# Patient Record
Sex: MALE | Race: WHITE | NOT HISPANIC OR LATINO | Employment: OTHER | URBAN - METROPOLITAN AREA
[De-identification: names, ages, dates, MRNs, and addresses within clinical notes are randomized per-mention and may not be internally consistent; named-entity substitution may affect disease eponyms.]

---

## 2018-06-10 ENCOUNTER — HISTORICAL (OUTPATIENT)
Dept: URGENT CARE | Facility: CLINIC | Age: 68
End: 2018-06-10

## 2018-06-10 LAB
BILIRUB SERPL-MCNC: NEGATIVE MG/DL
BLOOD URINE, POC: NEGATIVE
CLARITY, POC UA: CLEAR
COLOR, POC UA: YELLOW
GLUCOSE UR QL STRIP: NEGATIVE
KETONES UR QL STRIP: NEGATIVE
LEUKOCYTE EST, POC UA: NORMAL
NITRITE, POC UA: NEGATIVE
PH, POC UA: 5
PROTEIN, POC: NEGATIVE
SPECIFIC GRAVITY, POC UA: 1.01
UROBILINOGEN, POC UA: NORMAL

## 2022-04-11 ENCOUNTER — HISTORICAL (OUTPATIENT)
Dept: ADMINISTRATIVE | Facility: HOSPITAL | Age: 72
End: 2022-04-11

## 2022-04-25 VITALS
DIASTOLIC BLOOD PRESSURE: 62 MMHG | HEIGHT: 68 IN | SYSTOLIC BLOOD PRESSURE: 112 MMHG | OXYGEN SATURATION: 97 % | WEIGHT: 154.56 LBS | BODY MASS INDEX: 23.43 KG/M2

## 2022-09-18 ENCOUNTER — HOSPITAL ENCOUNTER (INPATIENT)
Facility: HOSPITAL | Age: 72
LOS: 1 days | Discharge: HOME | DRG: 352 | End: 2022-09-20
Attending: EMERGENCY MEDICINE | Admitting: SURGERY
Payer: MEDICARE

## 2022-09-18 DIAGNOSIS — K40.90 RIGHT INGUINAL HERNIA: ICD-10-CM

## 2022-09-18 DIAGNOSIS — Z41.9 SURGERY, ELECTIVE: ICD-10-CM

## 2022-09-18 DIAGNOSIS — R11.14 BILIOUS VOMITING WITH NAUSEA: ICD-10-CM

## 2022-09-18 DIAGNOSIS — K41.30 INCARCERATED FEMORAL HERNIA: Primary | ICD-10-CM

## 2022-09-18 LAB
ALBUMIN SERPL-MCNC: 4.2 GM/DL (ref 3.4–4.8)
ALBUMIN/GLOB SERPL: 1.2 RATIO (ref 1.1–2)
ALP SERPL-CCNC: 101 UNIT/L (ref 40–150)
ALT SERPL-CCNC: 23 UNIT/L (ref 0–55)
APPEARANCE UR: CLEAR
AST SERPL-CCNC: 38 UNIT/L (ref 5–34)
BACTERIA #/AREA URNS AUTO: NORMAL /HPF
BASOPHILS # BLD AUTO: 0.04 X10(3)/MCL (ref 0–0.2)
BASOPHILS # BLD AUTO: 0.05 X10(3)/MCL (ref 0–0.2)
BASOPHILS NFR BLD AUTO: 0.5 %
BASOPHILS NFR BLD AUTO: 0.6 %
BILIRUB UR QL STRIP.AUTO: NEGATIVE MG/DL
BILIRUBIN DIRECT+TOT PNL SERPL-MCNC: 0.5 MG/DL
BUN SERPL-MCNC: 19.9 MG/DL (ref 8.4–25.7)
CALCIUM SERPL-MCNC: 9.7 MG/DL (ref 8.8–10)
CHLORIDE SERPL-SCNC: 101 MMOL/L (ref 98–107)
CO2 SERPL-SCNC: 26 MMOL/L (ref 23–31)
COLOR UR AUTO: YELLOW
CREAT SERPL-MCNC: 1.13 MG/DL (ref 0.73–1.18)
EOSINOPHIL # BLD AUTO: 0.08 X10(3)/MCL (ref 0–0.9)
EOSINOPHIL # BLD AUTO: 0.11 X10(3)/MCL (ref 0–0.9)
EOSINOPHIL NFR BLD AUTO: 0.9 %
EOSINOPHIL NFR BLD AUTO: 1.3 %
ERYTHROCYTE [DISTWIDTH] IN BLOOD BY AUTOMATED COUNT: 13.2 % (ref 11.5–17)
ERYTHROCYTE [DISTWIDTH] IN BLOOD BY AUTOMATED COUNT: 13.2 % (ref 11.5–17)
GFR SERPLBLD CREATININE-BSD FMLA CKD-EPI: >60 MLS/MIN/1.73/M2
GLOBULIN SER-MCNC: 3.6 GM/DL (ref 2.4–3.5)
GLUCOSE SERPL-MCNC: 169 MG/DL (ref 82–115)
GLUCOSE UR QL STRIP.AUTO: NEGATIVE MG/DL
HCT VFR BLD AUTO: 37.8 % (ref 42–52)
HCT VFR BLD AUTO: 39.7 % (ref 42–52)
HGB BLD-MCNC: 12.2 GM/DL (ref 14–18)
HGB BLD-MCNC: 13.3 GM/DL (ref 14–18)
IMM GRANULOCYTES # BLD AUTO: 0.02 X10(3)/MCL (ref 0–0.04)
IMM GRANULOCYTES # BLD AUTO: 0.03 X10(3)/MCL (ref 0–0.04)
IMM GRANULOCYTES NFR BLD AUTO: 0.2 %
IMM GRANULOCYTES NFR BLD AUTO: 0.3 %
KETONES UR QL STRIP.AUTO: NEGATIVE MG/DL
LACTATE SERPL-SCNC: 0.8 MMOL/L (ref 0.5–2.2)
LEUKOCYTE ESTERASE UR QL STRIP.AUTO: NEGATIVE UNIT/L
LIPASE SERPL-CCNC: 18 U/L
LYMPHOCYTES # BLD AUTO: 0.92 X10(3)/MCL (ref 0.6–4.6)
LYMPHOCYTES # BLD AUTO: 1.01 X10(3)/MCL (ref 0.6–4.6)
LYMPHOCYTES NFR BLD AUTO: 10.9 %
LYMPHOCYTES NFR BLD AUTO: 11.6 %
MCH RBC QN AUTO: 30.1 PG (ref 27–31)
MCH RBC QN AUTO: 31 PG (ref 27–31)
MCHC RBC AUTO-ENTMCNC: 32.3 MG/DL (ref 33–36)
MCHC RBC AUTO-ENTMCNC: 33.5 MG/DL (ref 33–36)
MCV RBC AUTO: 92.5 FL (ref 80–94)
MCV RBC AUTO: 93.3 FL (ref 80–94)
MONOCYTES # BLD AUTO: 0.52 X10(3)/MCL (ref 0.1–1.3)
MONOCYTES # BLD AUTO: 0.58 X10(3)/MCL (ref 0.1–1.3)
MONOCYTES NFR BLD AUTO: 6 %
MONOCYTES NFR BLD AUTO: 6.8 %
NEUTROPHILS # BLD AUTO: 6.8 X10(3)/MCL (ref 2.1–9.2)
NEUTROPHILS # BLD AUTO: 7 X10(3)/MCL (ref 2.1–9.2)
NEUTROPHILS NFR BLD AUTO: 80.2 %
NEUTROPHILS NFR BLD AUTO: 80.7 %
NITRITE UR QL STRIP.AUTO: NEGATIVE
NRBC BLD AUTO-RTO: 0 %
NRBC BLD AUTO-RTO: 0 %
PH UR STRIP.AUTO: 6.5 [PH]
PLATELET # BLD AUTO: 234 X10(3)/MCL (ref 130–400)
PLATELET # BLD AUTO: 241 X10(3)/MCL (ref 130–400)
PMV BLD AUTO: 12 FL (ref 7.4–10.4)
PMV BLD AUTO: 12.3 FL (ref 7.4–10.4)
POTASSIUM SERPL-SCNC: 3.7 MMOL/L (ref 3.5–5.1)
PROT SERPL-MCNC: 7.8 GM/DL (ref 5.8–7.6)
PROT UR QL STRIP.AUTO: NEGATIVE MG/DL
RBC # BLD AUTO: 4.05 X10(6)/MCL (ref 4.7–6.1)
RBC # BLD AUTO: 4.29 X10(6)/MCL (ref 4.7–6.1)
RBC #/AREA URNS AUTO: <5 /HPF
RBC UR QL AUTO: NEGATIVE UNIT/L
SODIUM SERPL-SCNC: 135 MMOL/L (ref 136–145)
SP GR UR STRIP.AUTO: >=1.04 (ref 1–1.03)
SQUAMOUS #/AREA URNS AUTO: <5 /HPF
UROBILINOGEN UR STRIP-ACNC: 0.2 MG/DL
WBC # SPEC AUTO: 8.5 X10(3)/MCL (ref 4.5–11.5)
WBC # SPEC AUTO: 8.7 X10(3)/MCL (ref 4.5–11.5)
WBC #/AREA URNS AUTO: <5 /HPF

## 2022-09-18 PROCEDURE — 96374 THER/PROPH/DIAG INJ IV PUSH: CPT

## 2022-09-18 PROCEDURE — G0378 HOSPITAL OBSERVATION PER HR: HCPCS

## 2022-09-18 PROCEDURE — 83690 ASSAY OF LIPASE: CPT | Performed by: NURSE PRACTITIONER

## 2022-09-18 PROCEDURE — 81001 URINALYSIS AUTO W/SCOPE: CPT | Performed by: NURSE PRACTITIONER

## 2022-09-18 PROCEDURE — 85025 COMPLETE CBC W/AUTO DIFF WBC: CPT | Performed by: STUDENT IN AN ORGANIZED HEALTH CARE EDUCATION/TRAINING PROGRAM

## 2022-09-18 PROCEDURE — 25000003 PHARM REV CODE 250: Performed by: STUDENT IN AN ORGANIZED HEALTH CARE EDUCATION/TRAINING PROGRAM

## 2022-09-18 PROCEDURE — 83605 ASSAY OF LACTIC ACID: CPT | Performed by: STUDENT IN AN ORGANIZED HEALTH CARE EDUCATION/TRAINING PROGRAM

## 2022-09-18 PROCEDURE — 85025 COMPLETE CBC W/AUTO DIFF WBC: CPT | Performed by: NURSE PRACTITIONER

## 2022-09-18 PROCEDURE — 36415 COLL VENOUS BLD VENIPUNCTURE: CPT | Performed by: NURSE PRACTITIONER

## 2022-09-18 PROCEDURE — 99285 EMERGENCY DEPT VISIT HI MDM: CPT | Mod: 25

## 2022-09-18 PROCEDURE — 96372 THER/PROPH/DIAG INJ SC/IM: CPT | Performed by: STUDENT IN AN ORGANIZED HEALTH CARE EDUCATION/TRAINING PROGRAM

## 2022-09-18 PROCEDURE — 80053 COMPREHEN METABOLIC PANEL: CPT | Performed by: NURSE PRACTITIONER

## 2022-09-18 PROCEDURE — 63600175 PHARM REV CODE 636 W HCPCS: Performed by: EMERGENCY MEDICINE

## 2022-09-18 PROCEDURE — 96375 TX/PRO/DX INJ NEW DRUG ADDON: CPT

## 2022-09-18 PROCEDURE — 25500020 PHARM REV CODE 255: Performed by: EMERGENCY MEDICINE

## 2022-09-18 PROCEDURE — 63600175 PHARM REV CODE 636 W HCPCS: Performed by: STUDENT IN AN ORGANIZED HEALTH CARE EDUCATION/TRAINING PROGRAM

## 2022-09-18 RX ORDER — ADHESIVE BANDAGE
30 BANDAGE TOPICAL DAILY PRN
Status: DISCONTINUED | OUTPATIENT
Start: 2022-09-18 | End: 2022-09-20 | Stop reason: HOSPADM

## 2022-09-18 RX ORDER — TALC
6 POWDER (GRAM) TOPICAL NIGHTLY PRN
Status: DISCONTINUED | OUTPATIENT
Start: 2022-09-18 | End: 2022-09-20 | Stop reason: HOSPADM

## 2022-09-18 RX ORDER — OXYCODONE HYDROCHLORIDE 5 MG/1
5 TABLET ORAL EVERY 4 HOURS PRN
Status: DISCONTINUED | OUTPATIENT
Start: 2022-09-18 | End: 2022-09-20 | Stop reason: HOSPADM

## 2022-09-18 RX ORDER — PHENOBARBITAL 32.4 MG/1
32.4 TABLET ORAL 2 TIMES DAILY
Status: DISCONTINUED | OUTPATIENT
Start: 2022-09-19 | End: 2022-09-19

## 2022-09-18 RX ORDER — METHOCARBAMOL 750 MG/1
750 TABLET, FILM COATED ORAL 3 TIMES DAILY
Status: DISCONTINUED | OUTPATIENT
Start: 2022-09-18 | End: 2022-09-20 | Stop reason: HOSPADM

## 2022-09-18 RX ORDER — PHENOBARBITAL 32.4 MG/1
32.4 TABLET ORAL 2 TIMES DAILY
Status: DISCONTINUED | OUTPATIENT
Start: 2022-09-18 | End: 2022-09-18

## 2022-09-18 RX ORDER — ACETAMINOPHEN 325 MG/1
650 TABLET ORAL EVERY 4 HOURS
Status: DISCONTINUED | OUTPATIENT
Start: 2022-09-18 | End: 2022-09-20 | Stop reason: HOSPADM

## 2022-09-18 RX ORDER — OXYCODONE HYDROCHLORIDE 5 MG/1
10 TABLET ORAL EVERY 4 HOURS PRN
Status: DISCONTINUED | OUTPATIENT
Start: 2022-09-18 | End: 2022-09-20 | Stop reason: HOSPADM

## 2022-09-18 RX ORDER — SODIUM CHLORIDE 9 MG/ML
INJECTION, SOLUTION INTRAVENOUS CONTINUOUS
Status: DISCONTINUED | OUTPATIENT
Start: 2022-09-18 | End: 2022-09-20 | Stop reason: HOSPADM

## 2022-09-18 RX ORDER — MULTIVITAMIN
1 TABLET ORAL DAILY
COMMUNITY

## 2022-09-18 RX ORDER — PHENYTOIN SODIUM 100 MG/1
100 CAPSULE, EXTENDED RELEASE ORAL 2 TIMES DAILY
Status: DISCONTINUED | OUTPATIENT
Start: 2022-09-18 | End: 2022-09-20 | Stop reason: HOSPADM

## 2022-09-18 RX ORDER — MORPHINE SULFATE 4 MG/ML
4 INJECTION, SOLUTION INTRAMUSCULAR; INTRAVENOUS
Status: COMPLETED | OUTPATIENT
Start: 2022-09-18 | End: 2022-09-18

## 2022-09-18 RX ORDER — ENOXAPARIN SODIUM 100 MG/ML
40 INJECTION SUBCUTANEOUS EVERY 24 HOURS
Status: DISCONTINUED | OUTPATIENT
Start: 2022-09-18 | End: 2022-09-20 | Stop reason: HOSPADM

## 2022-09-18 RX ORDER — GABAPENTIN 300 MG/1
300 CAPSULE ORAL 3 TIMES DAILY
Status: DISCONTINUED | OUTPATIENT
Start: 2022-09-18 | End: 2022-09-20 | Stop reason: HOSPADM

## 2022-09-18 RX ORDER — ATORVASTATIN CALCIUM 20 MG/1
20 TABLET, FILM COATED ORAL NIGHTLY
COMMUNITY
Start: 2022-09-02

## 2022-09-18 RX ORDER — DOCUSATE SODIUM 100 MG/1
100 CAPSULE, LIQUID FILLED ORAL 2 TIMES DAILY
Status: DISCONTINUED | OUTPATIENT
Start: 2022-09-18 | End: 2022-09-20 | Stop reason: HOSPADM

## 2022-09-18 RX ORDER — LACTOBACIL 2/BIFIDO 11/S.THERM 112.5B
CAPSULE ORAL
COMMUNITY

## 2022-09-18 RX ORDER — CHOLECALCIFEROL (VITAMIN D3) 50 MCG
2000 TABLET ORAL 2 TIMES DAILY
COMMUNITY

## 2022-09-18 RX ORDER — ONDANSETRON 2 MG/ML
4 INJECTION INTRAMUSCULAR; INTRAVENOUS
Status: COMPLETED | OUTPATIENT
Start: 2022-09-18 | End: 2022-09-18

## 2022-09-18 RX ORDER — POLYETHYLENE GLYCOL 3350 17 G/17G
17 POWDER, FOR SOLUTION ORAL 2 TIMES DAILY
Status: DISCONTINUED | OUTPATIENT
Start: 2022-09-18 | End: 2022-09-20 | Stop reason: HOSPADM

## 2022-09-18 RX ORDER — ASPIRIN 81 MG/1
81 TABLET ORAL
COMMUNITY

## 2022-09-18 RX ORDER — PHENYTOIN SODIUM 100 MG/1
100 CAPSULE, EXTENDED RELEASE ORAL
COMMUNITY
Start: 2022-09-08

## 2022-09-18 RX ORDER — PHENOBARBITAL 15 MG/1
15 TABLET ORAL
COMMUNITY
Start: 2022-09-08

## 2022-09-18 RX ADMIN — ONDANSETRON 4 MG: 2 INJECTION INTRAMUSCULAR; INTRAVENOUS at 08:09

## 2022-09-18 RX ADMIN — METHOCARBAMOL 750 MG: 750 TABLET ORAL at 09:09

## 2022-09-18 RX ADMIN — OXYCODONE 10 MG: 5 TABLET ORAL at 11:09

## 2022-09-18 RX ADMIN — DOCUSATE SODIUM 100 MG: 100 CAPSULE, LIQUID FILLED ORAL at 09:09

## 2022-09-18 RX ADMIN — ENOXAPARIN SODIUM 40 MG: 40 INJECTION SUBCUTANEOUS at 09:09

## 2022-09-18 RX ADMIN — POLYETHYLENE GLYCOL 3350 17 G: 17 POWDER, FOR SOLUTION ORAL at 09:09

## 2022-09-18 RX ADMIN — IOPAMIDOL 100 ML: 755 INJECTION, SOLUTION INTRAVENOUS at 08:09

## 2022-09-18 RX ADMIN — MORPHINE SULFATE 4 MG: 4 INJECTION INTRAVENOUS at 08:09

## 2022-09-18 RX ADMIN — SODIUM CHLORIDE: 9 INJECTION, SOLUTION INTRAVENOUS at 09:09

## 2022-09-18 RX ADMIN — ACETAMINOPHEN 650 MG: 325 TABLET ORAL at 09:09

## 2022-09-18 RX ADMIN — GABAPENTIN 300 MG: 300 CAPSULE ORAL at 09:09

## 2022-09-19 ENCOUNTER — ANESTHESIA EVENT (OUTPATIENT)
Dept: SURGERY | Facility: HOSPITAL | Age: 72
DRG: 352 | End: 2022-09-19
Payer: MEDICARE

## 2022-09-19 ENCOUNTER — ANESTHESIA (OUTPATIENT)
Dept: SURGERY | Facility: HOSPITAL | Age: 72
DRG: 352 | End: 2022-09-19
Payer: MEDICARE

## 2022-09-19 PROBLEM — K41.30 INCARCERATED FEMORAL HERNIA: Status: ACTIVE | Noted: 2022-09-19

## 2022-09-19 LAB
ALBUMIN SERPL-MCNC: 3.6 GM/DL (ref 3.4–4.8)
ALBUMIN/GLOB SERPL: 1.3 RATIO (ref 1.1–2)
ALP SERPL-CCNC: 83 UNIT/L (ref 40–150)
ALT SERPL-CCNC: 16 UNIT/L (ref 0–55)
AST SERPL-CCNC: 28 UNIT/L (ref 5–34)
BASOPHILS # BLD AUTO: 0.05 X10(3)/MCL (ref 0–0.2)
BASOPHILS NFR BLD AUTO: 0.5 %
BILIRUBIN DIRECT+TOT PNL SERPL-MCNC: 0.6 MG/DL
BUN SERPL-MCNC: 14.4 MG/DL (ref 8.4–25.7)
CALCIUM SERPL-MCNC: 9 MG/DL (ref 8.8–10)
CHLORIDE SERPL-SCNC: 103 MMOL/L (ref 98–107)
CO2 SERPL-SCNC: 27 MMOL/L (ref 23–31)
CREAT SERPL-MCNC: 1 MG/DL (ref 0.73–1.18)
CRP SERPL-MCNC: 11.7 MG/L
EOSINOPHIL # BLD AUTO: 0.32 X10(3)/MCL (ref 0–0.9)
EOSINOPHIL NFR BLD AUTO: 3.1 %
ERYTHROCYTE [DISTWIDTH] IN BLOOD BY AUTOMATED COUNT: 13.4 % (ref 11.5–17)
GFR SERPLBLD CREATININE-BSD FMLA CKD-EPI: >60 MLS/MIN/1.73/M2
GLOBULIN SER-MCNC: 2.8 GM/DL (ref 2.4–3.5)
GLUCOSE SERPL-MCNC: 95 MG/DL (ref 82–115)
HCT VFR BLD AUTO: 37.9 % (ref 42–52)
HGB BLD-MCNC: 12.4 GM/DL (ref 14–18)
IMM GRANULOCYTES # BLD AUTO: 0.03 X10(3)/MCL (ref 0–0.04)
IMM GRANULOCYTES NFR BLD AUTO: 0.3 %
LACTATE SERPL-SCNC: 0.7 MMOL/L (ref 0.5–2.2)
LYMPHOCYTES # BLD AUTO: 1.93 X10(3)/MCL (ref 0.6–4.6)
LYMPHOCYTES NFR BLD AUTO: 18.8 %
MCH RBC QN AUTO: 30 PG (ref 27–31)
MCHC RBC AUTO-ENTMCNC: 32.7 MG/DL (ref 33–36)
MCV RBC AUTO: 91.5 FL (ref 80–94)
MONOCYTES # BLD AUTO: 1.01 X10(3)/MCL (ref 0.1–1.3)
MONOCYTES NFR BLD AUTO: 9.8 %
NEUTROPHILS # BLD AUTO: 6.9 X10(3)/MCL (ref 2.1–9.2)
NEUTROPHILS NFR BLD AUTO: 67.5 %
NRBC BLD AUTO-RTO: 0 %
PLATELET # BLD AUTO: 223 X10(3)/MCL (ref 130–400)
PMV BLD AUTO: 12.1 FL (ref 7.4–10.4)
POTASSIUM SERPL-SCNC: 3.9 MMOL/L (ref 3.5–5.1)
PREALB SERPL-MCNC: 27.8 MG/DL (ref 16–42)
PROT SERPL-MCNC: 6.4 GM/DL (ref 5.8–7.6)
RBC # BLD AUTO: 4.14 X10(6)/MCL (ref 4.7–6.1)
SODIUM SERPL-SCNC: 136 MMOL/L (ref 136–145)
WBC # SPEC AUTO: 10.3 X10(3)/MCL (ref 4.5–11.5)

## 2022-09-19 PROCEDURE — 99900035 HC TECH TIME PER 15 MIN (STAT)

## 2022-09-19 PROCEDURE — 93005 ELECTROCARDIOGRAM TRACING: CPT

## 2022-09-19 PROCEDURE — 93010 EKG 12-LEAD: ICD-10-PCS | Mod: ,,, | Performed by: INTERNAL MEDICINE

## 2022-09-19 PROCEDURE — C1781 MESH (IMPLANTABLE): HCPCS | Performed by: COLON & RECTAL SURGERY

## 2022-09-19 PROCEDURE — 93010 ELECTROCARDIOGRAM REPORT: CPT | Mod: ,,, | Performed by: INTERNAL MEDICINE

## 2022-09-19 PROCEDURE — 36415 COLL VENOUS BLD VENIPUNCTURE: CPT | Performed by: STUDENT IN AN ORGANIZED HEALTH CARE EDUCATION/TRAINING PROGRAM

## 2022-09-19 PROCEDURE — 84134 ASSAY OF PREALBUMIN: CPT | Performed by: STUDENT IN AN ORGANIZED HEALTH CARE EDUCATION/TRAINING PROGRAM

## 2022-09-19 PROCEDURE — 36000707: Performed by: COLON & RECTAL SURGERY

## 2022-09-19 PROCEDURE — 83605 ASSAY OF LACTIC ACID: CPT | Performed by: STUDENT IN AN ORGANIZED HEALTH CARE EDUCATION/TRAINING PROGRAM

## 2022-09-19 PROCEDURE — 25000003 PHARM REV CODE 250: Performed by: STUDENT IN AN ORGANIZED HEALTH CARE EDUCATION/TRAINING PROGRAM

## 2022-09-19 PROCEDURE — 25000003 PHARM REV CODE 250: Performed by: EMERGENCY MEDICINE

## 2022-09-19 PROCEDURE — 63600175 PHARM REV CODE 636 W HCPCS: Performed by: NURSE PRACTITIONER

## 2022-09-19 PROCEDURE — 71000033 HC RECOVERY, INTIAL HOUR: Performed by: COLON & RECTAL SURGERY

## 2022-09-19 PROCEDURE — 11000001 HC ACUTE MED/SURG PRIVATE ROOM

## 2022-09-19 PROCEDURE — 63600175 PHARM REV CODE 636 W HCPCS: Performed by: STUDENT IN AN ORGANIZED HEALTH CARE EDUCATION/TRAINING PROGRAM

## 2022-09-19 PROCEDURE — 63600175 PHARM REV CODE 636 W HCPCS: Performed by: ANESTHESIOLOGY

## 2022-09-19 PROCEDURE — 85025 COMPLETE CBC W/AUTO DIFF WBC: CPT | Performed by: STUDENT IN AN ORGANIZED HEALTH CARE EDUCATION/TRAINING PROGRAM

## 2022-09-19 PROCEDURE — 99900031 HC PATIENT EDUCATION (STAT)

## 2022-09-19 PROCEDURE — 94799 UNLISTED PULMONARY SVC/PX: CPT

## 2022-09-19 PROCEDURE — 37000009 HC ANESTHESIA EA ADD 15 MINS: Performed by: COLON & RECTAL SURGERY

## 2022-09-19 PROCEDURE — 86140 C-REACTIVE PROTEIN: CPT | Performed by: STUDENT IN AN ORGANIZED HEALTH CARE EDUCATION/TRAINING PROGRAM

## 2022-09-19 PROCEDURE — 80053 COMPREHEN METABOLIC PANEL: CPT | Performed by: STUDENT IN AN ORGANIZED HEALTH CARE EDUCATION/TRAINING PROGRAM

## 2022-09-19 PROCEDURE — 36000706: Performed by: COLON & RECTAL SURGERY

## 2022-09-19 PROCEDURE — C1729 CATH, DRAINAGE: HCPCS | Performed by: COLON & RECTAL SURGERY

## 2022-09-19 PROCEDURE — 37000008 HC ANESTHESIA 1ST 15 MINUTES: Performed by: COLON & RECTAL SURGERY

## 2022-09-19 PROCEDURE — 25000003 PHARM REV CODE 250: Performed by: COLON & RECTAL SURGERY

## 2022-09-19 DEVICE — IMPLANTABLE DEVICE: Type: IMPLANTABLE DEVICE | Site: ABDOMEN | Status: FUNCTIONAL

## 2022-09-19 RX ORDER — ROCURONIUM BROMIDE 10 MG/ML
INJECTION, SOLUTION INTRAVENOUS
Status: DISCONTINUED | OUTPATIENT
Start: 2022-09-19 | End: 2022-09-19

## 2022-09-19 RX ORDER — LIDOCAINE HYDROCHLORIDE 20 MG/ML
INJECTION INTRAVENOUS
Status: DISCONTINUED | OUTPATIENT
Start: 2022-09-19 | End: 2022-09-19

## 2022-09-19 RX ORDER — ONDANSETRON 2 MG/ML
INJECTION INTRAMUSCULAR; INTRAVENOUS
Status: DISCONTINUED | OUTPATIENT
Start: 2022-09-19 | End: 2022-09-19

## 2022-09-19 RX ORDER — PHENOBARBITAL 15 MG/1
15 TABLET ORAL 3 TIMES DAILY
Status: DISCONTINUED | OUTPATIENT
Start: 2022-09-19 | End: 2022-09-20 | Stop reason: HOSPADM

## 2022-09-19 RX ORDER — HYDROMORPHONE HYDROCHLORIDE 2 MG/ML
0.2 INJECTION, SOLUTION INTRAMUSCULAR; INTRAVENOUS; SUBCUTANEOUS EVERY 5 MIN PRN
Status: DISCONTINUED | OUTPATIENT
Start: 2022-09-19 | End: 2022-09-19

## 2022-09-19 RX ORDER — SODIUM CHLORIDE, SODIUM GLUCONATE, SODIUM ACETATE, POTASSIUM CHLORIDE AND MAGNESIUM CHLORIDE 30; 37; 368; 526; 502 MG/100ML; MG/100ML; MG/100ML; MG/100ML; MG/100ML
1000 INJECTION, SOLUTION INTRAVENOUS CONTINUOUS
Status: CANCELLED | OUTPATIENT
Start: 2022-09-19 | End: 2022-10-19

## 2022-09-19 RX ORDER — PROPOFOL 10 MG/ML
VIAL (ML) INTRAVENOUS
Status: DISCONTINUED | OUTPATIENT
Start: 2022-09-19 | End: 2022-09-19

## 2022-09-19 RX ORDER — MIDAZOLAM HYDROCHLORIDE 1 MG/ML
INJECTION INTRAMUSCULAR; INTRAVENOUS
Status: COMPLETED
Start: 2022-09-19 | End: 2022-09-19

## 2022-09-19 RX ORDER — MIDAZOLAM HYDROCHLORIDE 1 MG/ML
INJECTION INTRAMUSCULAR; INTRAVENOUS
Status: DISCONTINUED | OUTPATIENT
Start: 2022-09-19 | End: 2022-09-19

## 2022-09-19 RX ORDER — FENTANYL CITRATE 50 UG/ML
INJECTION, SOLUTION INTRAMUSCULAR; INTRAVENOUS
Status: DISCONTINUED | OUTPATIENT
Start: 2022-09-19 | End: 2022-09-19

## 2022-09-19 RX ORDER — ONDANSETRON 2 MG/ML
4 INJECTION INTRAMUSCULAR; INTRAVENOUS EVERY 8 HOURS PRN
Status: DISCONTINUED | OUTPATIENT
Start: 2022-09-19 | End: 2022-09-20 | Stop reason: HOSPADM

## 2022-09-19 RX ORDER — LIDOCAINE HYDROCHLORIDE 10 MG/ML
1 INJECTION, SOLUTION EPIDURAL; INFILTRATION; INTRACAUDAL; PERINEURAL ONCE
Status: CANCELLED | OUTPATIENT
Start: 2022-09-19 | End: 2022-09-19

## 2022-09-19 RX ORDER — PHENOBARBITAL 32.4 MG/1
32.4 TABLET ORAL 2 TIMES DAILY
Status: CANCELLED | OUTPATIENT
Start: 2022-09-19

## 2022-09-19 RX ORDER — CEFAZOLIN SODIUM 1 G/3ML
INJECTION, POWDER, FOR SOLUTION INTRAMUSCULAR; INTRAVENOUS
Status: DISCONTINUED | OUTPATIENT
Start: 2022-09-19 | End: 2022-09-19

## 2022-09-19 RX ORDER — DEXAMETHASONE SODIUM PHOSPHATE 4 MG/ML
INJECTION, SOLUTION INTRA-ARTICULAR; INTRALESIONAL; INTRAMUSCULAR; INTRAVENOUS; SOFT TISSUE
Status: DISCONTINUED | OUTPATIENT
Start: 2022-09-19 | End: 2022-09-19

## 2022-09-19 RX ORDER — PROCHLORPERAZINE EDISYLATE 5 MG/ML
5 INJECTION INTRAMUSCULAR; INTRAVENOUS EVERY 30 MIN PRN
Status: DISCONTINUED | OUTPATIENT
Start: 2022-09-19 | End: 2022-09-20 | Stop reason: HOSPADM

## 2022-09-19 RX ORDER — IPRATROPIUM BROMIDE AND ALBUTEROL SULFATE 2.5; .5 MG/3ML; MG/3ML
3 SOLUTION RESPIRATORY (INHALATION)
Status: DISCONTINUED | OUTPATIENT
Start: 2022-09-19 | End: 2022-09-20 | Stop reason: HOSPADM

## 2022-09-19 RX ORDER — DIPHENHYDRAMINE HYDROCHLORIDE 50 MG/ML
25 INJECTION INTRAMUSCULAR; INTRAVENOUS EVERY 6 HOURS PRN
Status: DISCONTINUED | OUTPATIENT
Start: 2022-09-19 | End: 2022-09-20 | Stop reason: HOSPADM

## 2022-09-19 RX ORDER — MORPHINE SULFATE 4 MG/ML
4 INJECTION, SOLUTION INTRAMUSCULAR; INTRAVENOUS EVERY 4 HOURS PRN
Status: DISCONTINUED | OUTPATIENT
Start: 2022-09-19 | End: 2022-09-20 | Stop reason: HOSPADM

## 2022-09-19 RX ORDER — HYDROMORPHONE HYDROCHLORIDE 2 MG/ML
0.4 INJECTION, SOLUTION INTRAMUSCULAR; INTRAVENOUS; SUBCUTANEOUS EVERY 5 MIN PRN
Status: DISCONTINUED | OUTPATIENT
Start: 2022-09-19 | End: 2022-09-20 | Stop reason: HOSPADM

## 2022-09-19 RX ORDER — ONDANSETRON 2 MG/ML
4 INJECTION INTRAMUSCULAR; INTRAVENOUS DAILY PRN
Status: DISCONTINUED | OUTPATIENT
Start: 2022-09-19 | End: 2022-09-20 | Stop reason: HOSPADM

## 2022-09-19 RX ORDER — EPHEDRINE SULFATE 50 MG/ML
INJECTION, SOLUTION INTRAVENOUS
Status: DISCONTINUED | OUTPATIENT
Start: 2022-09-19 | End: 2022-09-19

## 2022-09-19 RX ADMIN — MIDAZOLAM HYDROCHLORIDE 2 MG: 1 INJECTION, SOLUTION INTRAMUSCULAR; INTRAVENOUS at 12:09

## 2022-09-19 RX ADMIN — ACETAMINOPHEN 650 MG: 325 TABLET ORAL at 05:09

## 2022-09-19 RX ADMIN — EPHEDRINE SULFATE 10 MG: 50 INJECTION INTRAVENOUS at 01:09

## 2022-09-19 RX ADMIN — ACETAMINOPHEN 650 MG: 325 TABLET ORAL at 09:09

## 2022-09-19 RX ADMIN — HYDROMORPHONE HYDROCHLORIDE 0.4 MG: 2 INJECTION INTRAMUSCULAR; INTRAVENOUS; SUBCUTANEOUS at 03:09

## 2022-09-19 RX ADMIN — ONDANSETRON 4 MG: 2 INJECTION INTRAMUSCULAR; INTRAVENOUS at 01:09

## 2022-09-19 RX ADMIN — ENOXAPARIN SODIUM 40 MG: 40 INJECTION SUBCUTANEOUS at 05:09

## 2022-09-19 RX ADMIN — FENTANYL CITRATE 50 MCG: 50 INJECTION, SOLUTION INTRAMUSCULAR; INTRAVENOUS at 01:09

## 2022-09-19 RX ADMIN — MORPHINE SULFATE 4 MG: 4 INJECTION INTRAVENOUS at 07:09

## 2022-09-19 RX ADMIN — POLYETHYLENE GLYCOL 3350 17 G: 17 POWDER, FOR SOLUTION ORAL at 09:09

## 2022-09-19 RX ADMIN — LIDOCAINE HYDROCHLORIDE 40 MG: 20 INJECTION INTRAVENOUS at 01:09

## 2022-09-19 RX ADMIN — EPHEDRINE SULFATE 5 MG: 50 INJECTION INTRAVENOUS at 01:09

## 2022-09-19 RX ADMIN — METHOCARBAMOL 750 MG: 750 TABLET ORAL at 09:09

## 2022-09-19 RX ADMIN — PROPOFOL 160 MG: 10 INJECTION, EMULSION INTRAVENOUS at 01:09

## 2022-09-19 RX ADMIN — DOCUSATE SODIUM 100 MG: 100 CAPSULE, LIQUID FILLED ORAL at 09:09

## 2022-09-19 RX ADMIN — ROCURONIUM BROMIDE 50 MG: 10 SOLUTION INTRAVENOUS at 01:09

## 2022-09-19 RX ADMIN — SUGAMMADEX 200 MG: 100 INJECTION, SOLUTION INTRAVENOUS at 02:09

## 2022-09-19 RX ADMIN — GABAPENTIN 300 MG: 300 CAPSULE ORAL at 09:09

## 2022-09-19 RX ADMIN — OXYCODONE 5 MG: 5 TABLET ORAL at 06:09

## 2022-09-19 RX ADMIN — CEFAZOLIN 2 G: 330 INJECTION, POWDER, FOR SOLUTION INTRAMUSCULAR; INTRAVENOUS at 01:09

## 2022-09-19 RX ADMIN — EPHEDRINE SULFATE 10 MG: 50 INJECTION INTRAVENOUS at 02:09

## 2022-09-19 RX ADMIN — DEXAMETHASONE SODIUM PHOSPHATE 4 MG: 4 INJECTION, SOLUTION INTRA-ARTICULAR; INTRALESIONAL; INTRAMUSCULAR; INTRAVENOUS; SOFT TISSUE at 01:09

## 2022-09-19 RX ADMIN — SODIUM CHLORIDE, SODIUM GLUCONATE, SODIUM ACETATE, POTASSIUM CHLORIDE AND MAGNESIUM CHLORIDE: 526; 502; 368; 37; 30 INJECTION, SOLUTION INTRAVENOUS at 12:09

## 2022-09-19 RX ADMIN — PHENYTOIN SODIUM 100 MG: 100 CAPSULE ORAL at 05:09

## 2022-09-19 RX ADMIN — ONDANSETRON 4 MG: 2 INJECTION INTRAMUSCULAR; INTRAVENOUS at 07:09

## 2022-09-19 RX ADMIN — PHENOBARBITAL 15 MG: 15 TABLET ORAL at 05:09

## 2022-09-19 RX ADMIN — SODIUM CHLORIDE: 9 INJECTION, SOLUTION INTRAVENOUS at 11:09

## 2022-09-19 NOTE — ANESTHESIA PROCEDURE NOTES
Intubation    Date/Time: 9/19/2022 1:02 PM  Performed by: Merrick Mcclure CRNA  Authorized by: Jarvis Stuart MD     Intubation:     Induction:  Intravenous    Intubated:  Postinduction    Mask Ventilation:  Easy mask    Attempts:  1    Attempted By:  CRNA    Method of Intubation:  Direct    Blade:  Clay 3    Laryngeal View Grade: Grade IIA - cords partially seen      Difficult Airway Encountered?: No      Complications:  None    Airway Device:  Oral endotracheal tube    Airway Device Size:  7.5    Style/Cuff Inflation:  Cuffed (inflated to minimal occlusive pressure)    Tube secured:  22    Secured at:  The lips    Placement Verified By:  Capnometry    Complicating Factors:  None    Findings Post-Intubation:  BS equal bilateral and atraumatic/condition of teeth unchanged

## 2022-09-19 NOTE — PROGRESS NOTES
Trauma/Acute Care Surgery   Daily Progress Note     HD#0  POD#* No surgery date entered *    Subjective     HPI:   Mr. Barbour is a 72 yo M with no significant past medical history who presents with painful bulge to right groin that began suddenly this afternoon while lifting heavy boxes. Pain is associated with nausea and an episode of vomiting, but denies fever or chills. He is currently afebrile with stable vital signs and a normal WBC and LA. CT abdomen pelvis confirms a right inguinal hernia containing distal ileum with mild proximal dilation of bowel. Hernia was mostly reduced upon our evaluation.     Interval History:   NAERON. Vital signs have been stable and he is afebrile. Admits to persistent pain this AM. Denies any vomiting, nausea, SOB. Has been NPO since last night for possible surgical intervention today.     PRN Meds:  Mg hydroxide  Melatonin 6mg  Morphine 4mg  Ondansetron 4mg  Oxycodone 10mg  Oxycodone 5mg     Objective  Temp:  [98.2 °F (36.8 °C)] 98.2 °F (36.8 °C)  Pulse:  [52-61] 59  Resp:  [17-71] 17  SpO2:  [95 %-98 %] 96 %  BP: (119-146)/(60-78) 144/76     Intake/Output/LDA:  NG tube - 250 mL     Physical Exam:  GEN: NAD  NEURO: awake, alert, answering questions appropriately  RESP: nonlabored breathing, breath sounds normal bilaterally  CV: RRR  ABD: soft, nontender, no rebound or guarding present. Bowel sounds heard in all 4 quadrants. R inguinal hernia present with no overlyign skin changes or warmth  MSK: moving all 4 limbs spontaneously and purposefully      Labs  WBC 10.3  H/H 12.4/37.9  Plt 223  K 3.9  BUN/Cr 14.4/1.00  LA 0.7  UA negative    Imaging  CT abdomen/pelvis with contrast:  1. A cystic lesion measuring 2.9 cm wide is seen in the body of the pancreas (series 2 image 26). Correlate with clinical and laboratory findings with regards to further evaluation and follow-up.  2. There is a right inguinal hernia containing a mildly dilated fluid-filled segment of the distal ileum.  The loops of the ileum proximal to the herniated ileal segment are also mildly dilated and fluid-filled. The ileal loops immediately distal to the herniated segment appear collapsed. The degree of wall enhancement of the herniated ileal segment appears less as compared with the rest of the ileum. There is also mild subcutaneous fat stranding surrounding the right inguinal hernia. These findings are consistent with mechanical small bowel obstruction related to the right inguinal hernia with possible vascular compromise of the inguinal hernia not excluded. Correlate with clinical and laboratory findings with regards to further evaluation and follow-up.  3. Details and other findings as discussed above.     Assessment/Plan  72 yo M with right inguinal hernia that was reduced last night at bedside.      - Admit to surgery service for overnight observation  - pain and symptoms have persisted  - remain NPO   - Going to OR today for surgical repair; pt is consented       Jr Urban MD  [facility]

## 2022-09-19 NOTE — PROGRESS NOTES
Pt, spouse, and RN all present for report. Pt attached to v/s paige and vitals WNL. Sx site examined and intact. Post op orders reviewed. Everyone understands pt info with no further questions.

## 2022-09-19 NOTE — H&P
Acute Care Surgery  Admission H&P        Subjective:     HPI:   Mr. Barbour is a 70 yo M with no significant past medical history who presents with painful bulge to right groin that began suddenly this afternoon while lifting heavy boxes. Pain is associated with nausea and an episode of vomiting, but denies fever or chills. He is currently afebrile with stable vital signs and a normal WBC and LA. CT abdomen pelvis confirms a right inguinal hernia containing distal ileum with mild proximal dilation of bowel. Hernia was mostly reduced upon our evaluation.      PMH:  None     PSH:  None      Medications:  No current facility-administered medications on file prior to encounter.     Current Outpatient Medications on File Prior to Encounter   Medication Sig Dispense Refill    PHENobarbitaL (LUMINAL) 15 MG tablet Take 15 mg by mouth.      phenytoin (DILANTIN) 100 MG ER capsule Take 100 mg by mouth.      aspirin (ECOTRIN) 81 MG EC tablet Take 81 mg by mouth.      atorvastatin (LIPITOR) 20 MG tablet Take 20 mg by mouth every evening.      cholecalciferol, vitamin D3, (VITAMIN D3) 50 mcg (2,000 unit) Tab Take 2,000 Units by mouth 2 (two) times daily.      Lactobac 2-Bifido 11-S. therm (HIGH POTENCY PROBIOTIC) 112.5 billion cell Cap Take by mouth.      multivitamin (THERAGRAN) per tablet Take 1 tablet by mouth once daily.          Allergies:  Review of patient's allergies indicates:   Allergen Reactions    Procaine      Other reaction(s): Seizure         Social Hx:  Social History     Tobacco Use   Smoking Status Not on file   Smokeless Tobacco Not on file      Social History     Substance and Sexual Activity   Alcohol Use Not on file         Relevant Family Hx:  No family history on file.      Objective:     Vitals:  BP: 119-143/60-78   Pulse: 52-61   Resp: 17   Temp: 98.2 F      Physical Exam:  Gen: NAD  Neuro: awake, alert, answering questions appropriately  CV: RRR  Resp: non-labored breathing, ROSHNI  Abd: soft, ND, NT.  Right inguinal hernia that is easily reducible at bedside. No overlying skin changes or warmth.   Ext: moves all 4 spontaneously and purposefully  Skin: warm, well perfused     Labs:  WBC 8.5  H/H 12.2/37.8  Plt 234  K 3.7  BUN/Cr 19.9/1.13  LA 0.8  UA negative      Imaging:  CT abdomen/pelvis with contrast:  1. A cystic lesion measuring 2.9 cm wide is seen in the body of the pancreas (series 2 image 26). Correlate with clinical and laboratory findings with regards to further evaluation and follow-up.  2. There is a right inguinal hernia containing a mildly dilated fluid-filled segment of the distal ileum. The loops of the ileum proximal to the herniated ileal segment are also mildly dilated and fluid-filled. The ileal loops immediately distal to the herniated segment appear collapsed. The degree of wall enhancement of the herniated ileal segment appears less as compared with the rest of the ileum. There is also mild subcutaneous fat stranding surrounding the right inguinal hernia. These findings are consistent with mechanical small bowel obstruction related to the right inguinal hernia with possible vascular compromise of the inguinal hernia not excluded. Correlate with clinical and laboratory findings with regards to further evaluation and follow-up.  3. Details and other findings as discussed above.     Assessment/Plan:  72 yo M with right inguinal hernia that was reduced at bedside.     - Admit to surgery service for overnight observation  - will get repeat LA with am labs  - if pain and symptoms persist/worsen in the morning, will discuss surgical intervention  - keep NPO for now, Iman Ashraf MD  [facility] Surgery, PGY-2  09/19/2022 12:47 AM

## 2022-09-19 NOTE — INTERVAL H&P NOTE
The patient has been examined and the H&P has been reviewed:    I concur with the findings and no changes have occurred since H&P was written.    Surgery risks, benefits and alternative options discussed and understood by patient/family.          Active Hospital Problems    Diagnosis  POA    *Incarcerated femoral hernia [K41.30]  Yes      Resolved Hospital Problems   No resolved problems to display.

## 2022-09-19 NOTE — OP NOTE
[facility] - 8th Floor Med Surg  Operative Note      Date of Procedure: 9/19/2022     Procedure: Incarcerated right femoral hernia     Surgeon(s) and Role:     * Sarwat Bustillo MD - Primary    Assisting Surgeon: Alexis Scheuermann, MD    Pre-Operative Diagnosis: Right inguinal hernia [K40.90]    Post-Operative Diagnosis: Right femoral hernia    Anesthesia: General    Estimated Blood Loss (EBL): 10 mL           Specimens: Hernia sac     Description of Technical Procedures:  After informed consent was obtained, patient was brought to the operating room and placed in the supine position.  Next general endotracheal anesthesia was administered by member of the anesthesia team.  The right groin was prepped and draped in sterile surgical fashion.  A small transverse linear incision was made with a 15 blade approximately 1 finger breath above the right inguinal ligament.  Incision was deepened through the subcutaneous tissue to the external oblique fascia with electrocautery.  A stab incision was made laterally in the external oblique fascia, which was then opened down along its fibers through the external ring with Metzenbaum scissors.  Spermatic cord and its contents within mobilized off of the inguinal floor and isolated with a Penrose drain.  No hernia sac was identified on the anterior medial surface.  The incarcerated hernia was noted below low the inguinal ligament consistent with a femoral hernia.  This was dissected free from the surrounding subcutaneous tissue using gentle blunt dissection.  The hernia sac was opened and noted to contain omentum.  This was reduced into the peritoneal cavity.  The hernia sac was ligated with a 2-0 silk suture.  Excess sac was excised and sent for permanent pathology.  The remnant retracted through the defect.  The defect was then primarily repaired with interrupted 0 Vicryl suture and reinforced with bright tacks mesh sutured in place with 2-0 Prolene suture.   Spermatic cord and contents were returned to their normal anatomic position.  The wound was irrigated hemostasis was ensured.  The external oblique fascia was reapproximated with running 3-0 Vicryl suture.  Skin edges were reapproximated with Monocryl suture in a subcuticular fashion.  Incision was cleaned and a sterile dressing was applied.  Patient tolerated the procedure well and there were no complications.  He was awakened extubated in the operating room, then subsequent transferred to recovery in satisfactory condition.

## 2022-09-19 NOTE — ED PROVIDER NOTES
Encounter Date: 9/18/2022       History     Chief Complaint   Patient presents with    Inguinal Hernia     Recent swelling to right inguinal area, with nausea, vomiting.  Symptoms started today.  States has been moving heavy boxes.      Patient is a 71-year-old male presenting with complain of swelling and pain to the right inguinal area since around 3:00 a.m. this afternoon after moving heavy boxes.  Patient denies prior history of inguinal hernias.  Patient states he has had some nausea and vomiting.  He states that nausea is currently improved.    Review of patient's allergies indicates:   Allergen Reactions    Procaine      Other reaction(s): Seizure     No past medical history on file.  No past surgical history on file.  No family history on file.     Review of Systems   Constitutional: Negative.    HENT: Negative.     Respiratory: Negative.     Cardiovascular: Negative.    Gastrointestinal:  Positive for abdominal pain, nausea and vomiting. Negative for blood in stool, constipation and diarrhea.   Musculoskeletal: Negative.    Skin: Negative.    Neurological: Negative.    Psychiatric/Behavioral: Negative.       Physical Exam     Initial Vitals [09/18/22 1937]   BP Pulse Resp Temp SpO2   138/60 61 20 98.2 °F (36.8 °C) 98 %      MAP       --         Physical Exam    Nursing note and vitals reviewed.  Constitutional: He appears well-developed and well-nourished.   Neck: Neck supple.   Normal range of motion.  Cardiovascular:  Normal rate, regular rhythm and normal heart sounds.           Pulmonary/Chest: Breath sounds normal.   Abdominal: He exhibits no distension.   Patient has a palpable right inguinal hernia which is somewhat tender to palpation.  Abdomen is otherwise nontender to palpation.   Musculoskeletal:      Cervical back: Normal range of motion and neck supple.         ED Course   Procedures  Labs Reviewed   COMPREHENSIVE METABOLIC PANEL - Abnormal; Notable for the following components:       Result  Value    Sodium Level 135 (*)     Glucose Level 169 (*)     Protein Total 7.8 (*)     Globulin 3.6 (*)     Aspartate Aminotransferase 38 (*)     All other components within normal limits   CBC WITH DIFFERENTIAL - Abnormal; Notable for the following components:    RBC 4.29 (*)     Hgb 13.3 (*)     Hct 39.7 (*)     MPV 12.3 (*)     All other components within normal limits   LIPASE - Normal   CBC W/ AUTO DIFFERENTIAL    Narrative:     The following orders were created for panel order CBC Auto Differential.  Procedure                               Abnormality         Status                     ---------                               -----------         ------                     CBC with Differential[[id number]]        Abnormal            Final result                 Please view results for these tests on the individual orders.   URINALYSIS, REFLEX TO URINE CULTURE   CBC W/ AUTO DIFFERENTIAL    Narrative:     The following orders were created for panel order CBC auto differential.  Procedure                               Abnormality         Status                     ---------                               -----------         ------                     CBC with Differential[[id number]]                                                         Please view results for these tests on the individual orders.   LACTIC ACID, PLASMA   CBC WITH DIFFERENTIAL          Imaging Results              CT Abdomen Pelvis With Contrast (Preliminary result)  Result time 09/18/22 20:42:40      Preliminary result by Gorge Cerrato MD (09/18/22 20:42:40)                   Narrative:    START OF REPORT:  Technique: CT of the abdomen and pelvis was performed with axial images as well as sagittal and coronal reconstruction images with intravenous contrast.    Comparison: None available.    Clinical History: Possible inguinal hernia, has been lifting heavy boxes.    Dosage Information: Automated Exposure Control was utilized.    Findings:  Lines  and Tubes: None.  Thorax:  Lungs: Streaky opacity is present at the visualized lung bases, consistent with nonspecific dependent changes and scarring and atelectasis.  Pleura: No effusions or thickening are seen.  Heart: The heart size is within normal limits.  Liver: The liver appears unremarkable.  Biliary System: No intrahepatic or extrahepatic biliary duct dilatation is seen.  Gallbladder: The gallbladder appears unremarkable.  Pancreas: Moderate pancreatic atrophy is seen. A cystic lesion measuring 2.9 cm wide is seen in the body of the pancreas (series 2 image 26).  Spleen: The spleen appears unremarkable.  Adrenals: The adrenal glands appear unremarkable.  Kidneys: The left kidney appears unremarkable with no stones cysts masses or hydronephrosis. A single cyst measuring â7.2 mmâ is seen on âImage 40, Series 2â in the lower pole of the right kidney. The right kidney otherwise appears unremarkable with no stones masses or hydronephrosis identified.  Aorta: There is minimal calcification of the abdominal aorta and its branches.  IVC: Unremarkable.  Bowel:  Esophagus: The visualized distal esophagus appears unremarkable.  Stomach: The stomach appears unremarkable.  Duodenum: Unremarkable appearing duodenum.  Small Bowel: There is a right inguinal hernia containing a mildly dilated fluid-filled segment of the distal ileum. The loops of the ileum proximal to the herniated ileal segment are also mildly dilated and fluid-filled. The ileal loops immediately distal to the herniated segment appear collapsed. The degree of wall enhancement of the herniated ileal segment appears less as compared with the rest of the ileum. There is also mild subcutaneous fat stranding surrounding the right inguinal hernia. These findings are consistent with mechanical small bowel obstruction related to the right inguinal hernia with possible vascular compromise of the inguinal hernia not excluded.  Colon: Nondistended. A few  diverticula are seen in the transverse and descending and sigmoid colon. No associated inflammatory stranding or pericolonic fluid is seen to suggest diverticulitis.  Appendix: The appendix is not identified but no inflammatory changes are seen in the right lower quadrant to suggest appendicitis.  Peritoneum: No intraperitoneal free air or ascites is seen.    Pelvis:  Bladder: The bladder appears unremarkable.  Male:  Prostate gland: The prostate gland appears unremarkable.    Bony structures:  Dorsal Spine: There is mild to moderate spondylosis of the visualized dorsal spine. Multi-level degenerative disc changes are also seen in the lumbar spine.  Bony Pelvis: The visualized bony structures of the pelvis appear unremarkable.    Notifications: The results were discussed with the emergency room physician (Dr Patel) prior to dictation at 2022-09-18 21:15:41 CDT.      Impression:  1. A cystic lesion measuring 2.9 cm wide is seen in the body of the pancreas (series 2 image 26). Correlate with clinical and laboratory findings with regards to further evaluation and follow-up.  2. There is a right inguinal hernia containing a mildly dilated fluid-filled segment of the distal ileum. The loops of the ileum proximal to the herniated ileal segment are also mildly dilated and fluid-filled. The ileal loops immediately distal to the herniated segment appear collapsed. The degree of wall enhancement of the herniated ileal segment appears less as compared with the rest of the ileum. There is also mild subcutaneous fat stranding surrounding the right inguinal hernia. These findings are consistent with mechanical small bowel obstruction related to the right inguinal hernia with possible vascular compromise of the inguinal hernia not excluded. Correlate with clinical and laboratory findings with regards to further evaluation and follow-up.  3. Details and other findings as discussed above.                          Preliminary result  by Interface, Rad Results In (09/18/22 20:42:40)                   Narrative:    START OF REPORT:  Technique: CT of the abdomen and pelvis was performed with axial images as well as sagittal and coronal reconstruction images with intravenous contrast.    Comparison: None available.    Clinical History: Possible inguinal hernia, has been lifting heavy boxes.    Dosage Information: Automated Exposure Control was utilized.    Findings:  Lines and Tubes: None.  Thorax:  Lungs: Streaky opacity is present at the visualized lung bases, consistent with nonspecific dependent changes and scarring and atelectasis.  Pleura: No effusions or thickening are seen.  Heart: The heart size is within normal limits.  Liver: The liver appears unremarkable.  Biliary System: No intrahepatic or extrahepatic biliary duct dilatation is seen.  Gallbladder: The gallbladder appears unremarkable.  Pancreas: Moderate pancreatic atrophy is seen. A cystic lesion measuring 2.9 cm wide is seen in the body of the pancreas (series 2 image 26).  Spleen: The spleen appears unremarkable.  Adrenals: The adrenal glands appear unremarkable.  Kidneys: The left kidney appears unremarkable with no stones cysts masses or hydronephrosis. A single cyst measuring â7.2 mmâ is seen on âImage 40, Series 2â in the lower pole of the right kidney. The right kidney otherwise appears unremarkable with no stones masses or hydronephrosis identified.  Aorta: There is minimal calcification of the abdominal aorta and its branches.  IVC: Unremarkable.  Bowel:  Esophagus: The visualized distal esophagus appears unremarkable.  Stomach: The stomach appears unremarkable.  Duodenum: Unremarkable appearing duodenum.  Small Bowel: There is a right inguinal hernia containing a mildly dilated fluid-filled segment of the distal ileum. The loops of the ileum proximal to the herniated ileal segment are also mildly dilated and fluid-filled. The ileal loops immediately distal to the  herniated segment appear collapsed. The degree of wall enhancement of the herniated ileal segment appears less as compared with the rest of the ileum. There is also mild subcutaneous fat stranding surrounding the right inguinal hernia. These findings are consistent with mechanical small bowel obstruction related to the right inguinal hernia with possible vascular compromise of the inguinal hernia not excluded.  Colon: Nondistended. A few diverticula are seen in the transverse and descending and sigmoid colon. No associated inflammatory stranding or pericolonic fluid is seen to suggest diverticulitis.  Appendix: The appendix is not identified but no inflammatory changes are seen in the right lower quadrant to suggest appendicitis.  Peritoneum: No intraperitoneal free air or ascites is seen.    Pelvis:  Bladder: The bladder appears unremarkable.  Male:  Prostate gland: The prostate gland appears unremarkable.    Bony structures:  Dorsal Spine: There is mild to moderate spondylosis of the visualized dorsal spine. Multi-level degenerative disc changes are also seen in the lumbar spine.  Bony Pelvis: The visualized bony structures of the pelvis appear unremarkable.    Notifications: The results were discussed with the emergency room physician (Dr Patel) prior to dictation at 2022-09-18 21:15:41 CDT.      Impression:  1. A cystic lesion measuring 2.9 cm wide is seen in the body of the pancreas (series 2 image 26). Correlate with clinical and laboratory findings with regards to further evaluation and follow-up.  2. There is a right inguinal hernia containing a mildly dilated fluid-filled segment of the distal ileum. The loops of the ileum proximal to the herniated ileal segment are also mildly dilated and fluid-filled. The ileal loops immediately distal to the herniated segment appear collapsed. The degree of wall enhancement of the herniated ileal segment appears less as compared with the rest of the ileum. There is also  mild subcutaneous fat stranding surrounding the right inguinal hernia. These findings are consistent with mechanical small bowel obstruction related to the right inguinal hernia with possible vascular compromise of the inguinal hernia not excluded. Correlate with clinical and laboratory findings with regards to further evaluation and follow-up.  3. Details and other findings as discussed above.                                         Medications   0.9%  NaCl infusion ( Intravenous New Bag 9/18/22 2136)   enoxaparin injection 40 mg (40 mg Subcutaneous Given 9/18/22 2136)   acetaminophen tablet 650 mg (650 mg Oral Given 9/18/22 2136)   oxyCODONE immediate release tablet 5 mg (has no administration in time range)   oxyCODONE immediate release tablet 10 mg (has no administration in time range)   methocarbamoL tablet 750 mg (750 mg Oral Given 9/18/22 2136)   gabapentin capsule 300 mg (300 mg Oral Given 9/18/22 2136)   melatonin tablet 6 mg (has no administration in time range)   polyethylene glycol packet 17 g (17 g Oral Given 9/18/22 2136)   docusate sodium capsule 100 mg (100 mg Oral Given 9/18/22 2136)   magnesium hydroxide 400 mg/5 ml suspension 2,400 mg (has no administration in time range)   morphine injection 4 mg (4 mg Intravenous Given 9/18/22 2016)   ondansetron injection 4 mg (4 mg Intravenous Given 9/18/22 2015)   iopamidoL (ISOVUE-370) injection 100 mL (100 mLs Intravenous Given 9/18/22 2044)                 ED Course as of 09/18/22 2145   Sun Sep 18, 2022   2140 Surgery evaluated patient, will admit [KG]   2142 Dr Cruz reduced hernia site [KG]      ED Course User Index  [KG] Armand Cabrera MD                 Clinical Impression:   Final diagnoses:  [K40.90] Right inguinal hernia  [R11.14] Bilious vomiting with nausea (Primary)      ED Disposition Condition    Observation                 Armand Cabrera MD  09/18/22 2145

## 2022-09-19 NOTE — ANESTHESIA POSTPROCEDURE EVALUATION
Anesthesia Post Evaluation    Patient: Dimitri Barbour    Procedure(s) Performed: Procedure(s) (LRB):  REPAIR, HERNIA, INGUINAL (Right)    Final Anesthesia Type: general      Patient location during evaluation: PACU  Patient participation: Yes- Able to Participate  Level of consciousness: awake and alert and oriented  Post-procedure vital signs: reviewed and stable  Pain management: adequate  Airway patency: patent    PONV status at discharge: No PONV  Anesthetic complications: no      Cardiovascular status: hemodynamically stable  Respiratory status: unassisted  Hydration status: euvolemic  Follow-up not needed.          Vitals Value Taken Time   /51 09/19/22 1510   Temp 98.0 09/19/22 1512   Pulse 90 09/19/22 1511   Resp 21 09/19/22 1511   SpO2 95 % 09/19/22 1511   Vitals shown include unvalidated device data.      No case tracking events are documented in the log.      Pain/Abdi Score: Pain Rating Prior to Med Admin: 9 (9/19/2022  7:45 AM)  Pain Rating Post Med Admin: 9 (9/19/2022  7:41 AM)

## 2022-09-19 NOTE — TRANSFER OF CARE
"Anesthesia Transfer of Care Note    Patient: Dimitri Barbour    Procedure(s) Performed: Procedure(s) (LRB):  REPAIR, HERNIA, INGUINAL (Right)    Patient location: PACU    Anesthesia Type: general    Transport from OR: Transported from OR on room air with adequate spontaneous ventilation    Post pain: adequate analgesia    Post assessment: no apparent anesthetic complications    Post vital signs: stable    Level of consciousness: awake    Nausea/Vomiting: no nausea/vomiting    Complications: none    Transfer of care protocol was followed      Last vitals:   Visit Vitals  /71 (BP Location: Right arm, Patient Position: Lying)   Pulse 64   Temp 36.8 °C (98.2 °F) (Oral)   Resp 18   Ht 5' 7" (1.702 m)   Wt 63.5 kg (140 lb)   SpO2 97%   BMI 21.93 kg/m²     "

## 2022-09-19 NOTE — BRIEF OP NOTE
[facility] - [facility]  General Surgery  Brief Operative Note    Date of Procedure: 9/19/22    Procedure: Right femoral hernia repair w/ mesh    Surgeon(s) and Role:  Staff: Sarwat Bustillo MD  Resident(s): Alexis Scheuermann, MD    Pre-Operative Diagnosis: Incarceration femoral hernia    Post-Operative Diagnosis: Same    Anesthesia: GETA    Operative Findings: Femoral hernia contained viable omentum, no bowel    Description of Technical Procedures: Refer to full dictated operative report    Estimated Blood Loss (EBL): 10 cc           Implants: 13 x 9 cm polypropylene mesh    Drains: None    Specimens: Hernia sac    Complications: None            Condition: Stable    Disposition: PACU    Alexis Scheuermann, MD   [employer] General Surgery, PGY4  9/9/22 2:50 PM

## 2022-09-19 NOTE — ANESTHESIA PREPROCEDURE EVALUATION
09/19/2022  Dimitri Barbour is a 71 y.o., male.  REPAIR, HERNIA, INGUINAL     Dimitri Barbour    Pre-op Diagnosis: Right inguinal hernia [K40.90]    Procedure(s):  REPAIR, HERNIA, INGUINAL     Review of patient's allergies indicates:   Allergen Reactions    Procaine      Other reaction(s): Seizure       Current Outpatient Medications   Medication Instructions    aspirin (ECOTRIN) 81 mg, Oral    atorvastatin (LIPITOR) 20 mg, Oral, Nightly    cholecalciferol (vitamin D3) (VITAMIN D3) 2,000 Units, Oral, 2 times daily    Lactobac 2-Bifido 11-S. therm (HIGH POTENCY PROBIOTIC) 112.5 billion cell Cap Oral    multivitamin (THERAGRAN) per tablet 1 tablet, Oral, Daily    PHENobarbitaL (LUMINAL) 15 mg, Oral    phenytoin (DILANTIN) 100 mg, Oral     PMH - SEIZURES (last as young man)             HLD             COLITIS    PSH - COLONOSCOPY      VITAL SIGNS: 24 HR MIN & MAX LAST  Temp  Min: 36.8 °C (98.2 °F)  Max: 36.8 °C (98.2 °F) 36.8 °C (98.2 °F)  BP  Min: 119/68  Max: 146/77 137/71  Pulse  Min: 52  Max: 64 64  Resp  Min: 17  Max: 71 18  SpO2  Min: 95 %  Max: 98 % 97 %      CT Abdomen Pelvis With Contrast    Result Date: 9/19/2022  EXAMINATION: CT ABDOMEN PELVIS WITH CONTRAST CLINICAL HISTORY: Hernia, complicated;R inguinal hernia; TECHNIQUE: CT imaging of the abdomen and pelvis after the administration of 100 mL of Isovue 370 intravenous contrast. Dose length product is 263 mGycm. Automatic exposure control, adjustment of mA/kV or iterative reconstruction technique used to limit radiation dose. COMPARISON: No relevant comparison studies available at the time of dictation. FINDINGS: Liver/biliary: Normal liver.  No radiodense gallstones. No intra or extrahepatic biliary ductal dilation. Pancreas: Cystic lesion near the junction of the pancreatic tail and body measures about 30 mm.  Few possible thin internal  septations. Spleen: Normal. Adrenals: Normal. Genitourinary: Symmetric renal enhancement. No hydronephrosis. Bladder within normal limits. Prostate size within normal limits. Stomach/bowel: Right femoral hernia contains a short segment of distal small bowel.  Bowel leading up to the hernia is fluid-filled and borderline dilated.  Areas of mild stranding within and adjacent to the hernia.  No pneumatosis.  Normal appendix. Lymph nodes: No pathologically enlarged lymph node identified. Peritoneum: No ascites or free air. No fluid collection. Vasculature: Normal abdominal aortic caliber.  No portal venous gas. Abdominal wall: Tiny fat containing umbilical hernia. Lung bases: Areas of minimal atelectasis.  No pleural effusion..  4 mm solid nodule in the right lower lobe (series 4, image 8). Musculoskeletal: No acute osseous findings. Bilateral pars defects at L5 with grade 1 anterolisthesis of L5 on S1.     1. Small bowel containing right femoral hernia with findings suggestive of at least partial small bowel obstruction. 2. 30 mm cystic lesion in the pancreas which may contain few thin internal septations.  Given size, follow-up abdominal MRI recommended for further assessment if no prior studies are available to confirm stability of this finding. 3. Nonspecific 4 mm solid nodule in the right lower lobe.  If the patient is at increased risk for lung malignancy, and no remote CT scans of the chest or abdomen are available to document stability, a one year followup chest CT can be considered for surveillance purposes. If there is no increased risk for lung malignancy, no further followup is necessary for this nodule. No major discrepancy with the preliminary radiology report. Electronically signed by: Jose Walker Date:    09/19/2022 Time:    09:31    ECG - SB Late transition, no acute ST T changes 9/19/2022    Pre-op Assessment    I have reviewed the Patient Summary Reports.     I have reviewed the Nursing Notes. I  have reviewed the NPO Status.   I have reviewed the Medications.     Review of Systems  Anesthesia Hx:  No problems with previous Anesthesia    Hematology/Oncology:  Hematology Normal   Oncology Normal     EENT/Dental:EENT/Dental Normal   Cardiovascular:  Cardiovascular Normal Exercise tolerance: good   Functional Capacity good / => 4 METS    Pulmonary:  Pulmonary Normal    Renal/:   Denies Chronic Renal Disease.     Hepatic/GI:  Hepatic/GI Normal    Musculoskeletal:  Musculoskeletal Normal    Neurological:  Neurology Normal    Endocrine:  Endocrine Normal  Denies Morbid Obesity / BMI > 40  Dermatological:  Skin Normal    Psych:  Psychiatric Normal           Physical Exam  General: Alert, Oriented, Well nourished and Cooperative    Airway:  Mallampati: II   Mouth Opening: Normal  TM Distance: Normal  Tongue: Normal  Neck ROM: Normal ROM    Dental:  Intact    Chest/Lungs:  Clear to auscultation, Normal Respiratory Rate    Heart:  Rate: Normal  Rhythm: Regular Rhythm       Latest Reference Range & Units 09/19/22 05:00 09/19/22 05:02   WBC 4.5 - 11.5 x10(3)/mcL 10.3    RBC 4.70 - 6.10 x10(6)/mcL 4.14 (L)    Hemoglobin 14.0 - 18.0 gm/dL 12.4 (L)    Hematocrit 42.0 - 52.0 % 37.9 (L)    MCV 80.0 - 94.0 fL 91.5    MCH 27.0 - 31.0 pg 30.0    MCHC 33.0 - 36.0 mg/dL 32.7 (L)    RDW 11.5 - 17.0 % 13.4    Platelets 130 - 400 x10(3)/mcL 223    MPV 7.4 - 10.4 fL 12.1 (H)    Neut % % 67.5    LYMPH % % 18.8    Mono % % 9.8    Eosinophil % % 3.1    Basophil % % 0.5    Immature Granulocytes % 0.3    Neut # 2.1 - 9.2 x10(3)/mcL 6.9    Lymph # 0.6 - 4.6 x10(3)/mcL 1.93    Mono # 0.1 - 1.3 x10(3)/mcL 1.01    Eos # 0 - 0.9 x10(3)/mcL 0.32    Baso # 0 - 0.2 x10(3)/mcL 0.05    Immature Grans (Abs) 0 - 0.04 x10(3)/mcL 0.03    nRBC % 0.0    Sodium 136 - 145 mmol/L  136   Potassium 3.5 - 5.1 mmol/L  3.9   Chloride 98 - 107 mmol/L  103   CO2 23 - 31 mmol/L  27   BUN 8.4 - 25.7 mg/dL  14.4   Creatinine 0.73 - 1.18 mg/dL  1.00   eGFR  mls/min/1.73/m2  >60   Glucose 82 - 115 mg/dL  95   Calcium 8.8 - 10.0 mg/dL  9.0   (L): Data is abnormally low  (H): Data is abnormally high    Anesthesia Plan  Type of Anesthesia, risks & benefits discussed:    Anesthesia Type: Gen ETT  Intra-op Monitoring Plan: Standard ASA Monitors  Post Op Pain Control Plan: multimodal analgesia  Induction:  IV and Inhalation  Airway Plan: Direct  Informed Consent: Informed consent signed with the Patient and all parties understand the risks and agree with anesthesia plan.  All questions answered. Patient consented to blood products? Yes  ASA Score: 2  Day of Surgery Review of History & Physical: H&P Update referred to the surgeon/provider.    Ready For Surgery From Anesthesia Perspective.     .

## 2022-09-20 VITALS
HEART RATE: 76 BPM | SYSTOLIC BLOOD PRESSURE: 126 MMHG | TEMPERATURE: 98 F | WEIGHT: 140 LBS | DIASTOLIC BLOOD PRESSURE: 88 MMHG | RESPIRATION RATE: 20 BRPM | OXYGEN SATURATION: 95 % | BODY MASS INDEX: 21.97 KG/M2 | HEIGHT: 67 IN

## 2022-09-20 PROBLEM — K41.30 INCARCERATED FEMORAL HERNIA: Status: RESOLVED | Noted: 2022-09-19 | Resolved: 2022-09-20

## 2022-09-20 LAB
BASOPHILS # BLD AUTO: 0.04 X10(3)/MCL (ref 0–0.2)
BASOPHILS NFR BLD AUTO: 0.4 %
EOSINOPHIL # BLD AUTO: 0.23 X10(3)/MCL (ref 0–0.9)
EOSINOPHIL NFR BLD AUTO: 2.1 %
ERYTHROCYTE [DISTWIDTH] IN BLOOD BY AUTOMATED COUNT: 13.5 % (ref 11.5–17)
HCT VFR BLD AUTO: 33.4 % (ref 42–52)
HGB BLD-MCNC: 11.1 GM/DL (ref 14–18)
IMM GRANULOCYTES # BLD AUTO: 0.04 X10(3)/MCL (ref 0–0.04)
IMM GRANULOCYTES NFR BLD AUTO: 0.4 %
LYMPHOCYTES # BLD AUTO: 1.27 X10(3)/MCL (ref 0.6–4.6)
LYMPHOCYTES NFR BLD AUTO: 11.8 %
MCH RBC QN AUTO: 30 PG (ref 27–31)
MCHC RBC AUTO-ENTMCNC: 33.2 MG/DL (ref 33–36)
MCV RBC AUTO: 90.3 FL (ref 80–94)
MONOCYTES # BLD AUTO: 1 X10(3)/MCL (ref 0.1–1.3)
MONOCYTES NFR BLD AUTO: 9.3 %
NEUTROPHILS # BLD AUTO: 8.2 X10(3)/MCL (ref 2.1–9.2)
NEUTROPHILS NFR BLD AUTO: 76 %
NRBC BLD AUTO-RTO: 0 %
PLATELET # BLD AUTO: 208 X10(3)/MCL (ref 130–400)
PMV BLD AUTO: 12.7 FL (ref 7.4–10.4)
RBC # BLD AUTO: 3.7 X10(6)/MCL (ref 4.7–6.1)
WBC # SPEC AUTO: 10.8 X10(3)/MCL (ref 4.5–11.5)

## 2022-09-20 PROCEDURE — 25000003 PHARM REV CODE 250: Performed by: STUDENT IN AN ORGANIZED HEALTH CARE EDUCATION/TRAINING PROGRAM

## 2022-09-20 PROCEDURE — 36415 COLL VENOUS BLD VENIPUNCTURE: CPT | Performed by: STUDENT IN AN ORGANIZED HEALTH CARE EDUCATION/TRAINING PROGRAM

## 2022-09-20 PROCEDURE — 85025 COMPLETE CBC W/AUTO DIFF WBC: CPT | Performed by: STUDENT IN AN ORGANIZED HEALTH CARE EDUCATION/TRAINING PROGRAM

## 2022-09-20 PROCEDURE — 25000003 PHARM REV CODE 250: Performed by: COLON & RECTAL SURGERY

## 2022-09-20 PROCEDURE — 25000003 PHARM REV CODE 250: Performed by: EMERGENCY MEDICINE

## 2022-09-20 RX ORDER — OXYCODONE HYDROCHLORIDE 10 MG/1
10 TABLET ORAL EVERY 6 HOURS PRN
Qty: 10 TABLET | Refills: 0 | Status: SHIPPED | OUTPATIENT
Start: 2022-09-20

## 2022-09-20 RX ADMIN — METHOCARBAMOL 750 MG: 750 TABLET ORAL at 09:09

## 2022-09-20 RX ADMIN — DOCUSATE SODIUM 100 MG: 100 CAPSULE, LIQUID FILLED ORAL at 09:09

## 2022-09-20 RX ADMIN — PHENYTOIN SODIUM 100 MG: 100 CAPSULE ORAL at 06:09

## 2022-09-20 RX ADMIN — ACETAMINOPHEN 650 MG: 325 TABLET ORAL at 01:09

## 2022-09-20 RX ADMIN — PHENOBARBITAL 15 MG: 15 TABLET ORAL at 05:09

## 2022-09-20 RX ADMIN — ACETAMINOPHEN 650 MG: 325 TABLET ORAL at 09:09

## 2022-09-20 RX ADMIN — POLYETHYLENE GLYCOL 3350 17 G: 17 POWDER, FOR SOLUTION ORAL at 09:09

## 2022-09-20 RX ADMIN — ACETAMINOPHEN 650 MG: 325 TABLET ORAL at 06:09

## 2022-09-20 RX ADMIN — OXYCODONE 10 MG: 5 TABLET ORAL at 10:09

## 2022-09-20 RX ADMIN — GABAPENTIN 300 MG: 300 CAPSULE ORAL at 09:09

## 2022-09-20 NOTE — NURSING
Nurses Note -- 4 Eyes      9/20/2022    8:00 AM       Skin assessed during: Transfer      [x] No Pressure Injuries Present    []Prevention Measures Documented      [] Yes- Altered Skin Integrity Present or Discovered   [] LDA Added if Not in Epic (Describe Wound)   [] New Altered Skin Integrity was Present on Admit and Documented in LDA   [] Wound Image Taken    Wound Care Consulted? No    Attending Nurse:  Morales Aleman LPN     Second RN/Staff Member:  Oscar Rangel RN

## 2022-09-20 NOTE — NURSING
Patient discharged home. All post op instructions, Rx and follow ups given and explained to patient and wife. Prescriptions were given to patient to be filled at their preferred pharmacy. I have educated patient on the importance of not lifting anything heavier than 10 lbs for the next 6 weeks. Educated patient on constipation when taking narcotics. Patient in stable condition. All questions answered.

## 2022-09-20 NOTE — DISCHARGE SUMMARY
[facility]  Discharge Summary    Admit Date: 9/18/2022  Discharge Date: 9/20/2022  Admitting Physician: Sarwat Bustillo MD      Admission HPI:   Mr. Barbour is a 72 yo M with no significant past medical history who presents with painful bulge to right groin that began suddenly this afternoon while lifting heavy boxes. Pain is associated with nausea and an episode of vomiting, but denies fever or chills. He is currently afebrile with stable vital signs and a normal WBC and LA. CT abdomen pelvis confirms a right inguinal hernia containing distal ileum with mild proximal dilation of bowel. Hernia was mostly reduced upon our evaluation.     Hospital Course:   Pt admitted on 9/19/22. Underwent a femoral hernia repair on day of admission. Pt tolerated the procedure well. Denied any pain; tolerating PO intake, and surgical site is dry clean and intact. Pt has met all discharge criteria today (9/20/22) and is stable for discharge home.     Procedures Performed:   R femoral hernia repair    Final Diagnoses:   Active Hospital Problems   No active problems to display.      Resolved Hospital Problems    Diagnosis Date Resolved POA    *Incarcerated femoral hernia [K41.30] 09/20/2022 Yes       Discharge Condition: Stable    Disposition: Pt is stable and ready for discharge home. Follow up apt within 2 weeks.     Activity Instructions: No lifting over 10lb for 6 weeks.    Discharge Medications:  Current Discharge Medication List        START taking these medications    Details   oxyCODONE (ROXICODONE) 10 mg Tab immediate release tablet Take 1 tablet (10 mg total) by mouth every 6 (six) hours as needed for Pain.  Qty: 10 tablet, Refills: 0    Comments: Quantity prescribed more than 7 day supply? No           CONTINUE these medications which have NOT CHANGED    Details   aspirin (ECOTRIN) 81 MG EC tablet Take 81 mg by mouth.      atorvastatin (LIPITOR) 20 MG tablet Take 20 mg by mouth every evening.       cholecalciferol, vitamin D3, (VITAMIN D3) 50 mcg (2,000 unit) Tab Take 2,000 Units by mouth 2 (two) times daily.      Lactobac 2-Bifido 11-S. therm (HIGH POTENCY PROBIOTIC) 112.5 billion cell Cap Take by mouth.      multivitamin (THERAGRAN) per tablet Take 1 tablet by mouth once daily.      PHENobarbitaL (LUMINAL) 15 MG tablet Take 15 mg by mouth.      phenytoin (DILANTIN) 100 MG ER capsule Take 100 mg by mouth.             Jr Urban MD   [facility] PGY1  09/20/2022 6:54 AM

## 2022-09-21 ENCOUNTER — HISTORICAL (OUTPATIENT)
Dept: ADMINISTRATIVE | Facility: HOSPITAL | Age: 72
End: 2022-09-21
Payer: MEDICARE

## 2022-09-21 ENCOUNTER — PATIENT OUTREACH (OUTPATIENT)
Dept: ADMINISTRATIVE | Facility: CLINIC | Age: 72
End: 2022-09-21
Payer: MEDICARE

## 2022-09-21 NOTE — PROGRESS NOTES
C3 nurse spoke with Dimitri Barbour for a [facility] post hospital discharge follow up call. The patient has a scheduled HOSFU appointment with [facility] on 10/4/22 @ 10:20.

## 2022-09-26 ENCOUNTER — TELEPHONE (OUTPATIENT)
Dept: MEDSURG UNIT | Facility: HOSPITAL | Age: 72
End: 2022-09-26
Payer: MEDICARE

## 2022-09-26 NOTE — TELEPHONE ENCOUNTER
[facility] - Post discharge call       Pt called stating he has some swelling to his Scrotum and penis. No pain and urinating well. Instructed to elevate scrotum. He also states he has blisters to his back. Some have popped. He has sensitive skin and uses fragrance free detergent and such at home. Seems like an allergic reaction and he agrees. Will try benadryl and call if needs. He has an appt on 10/4/22

## 2022-10-04 ENCOUNTER — DOCUMENTATION ONLY (OUTPATIENT)
Dept: SURGERY | Facility: HOSPITAL | Age: 72
End: 2022-10-04
Payer: MEDICARE

## 2022-10-04 LAB
ESTROGEN SERPL-MCNC: NORMAL PG/ML
INSULIN SERPL-ACNC: NORMAL U[IU]/ML
LAB AP CLINICAL INFORMATION: NORMAL
LAB AP GROSS DESCRIPTION: NORMAL
LAB AP REPORT FOOTNOTES: NORMAL
T3RU NFR SERPL: NORMAL %

## 2022-10-04 NOTE — PROGRESS NOTES
[facility] ACUTE CARE NOTE    Date of surgery: 9/19/2022  Procedure:  Right femoral hernia repair w/ mesh  Surgeon: Dr. Bustillo     S:   Dimitri Barbour is a 71 year old male who presents to clinic for follow up. Has no acute complaints today. Denies fever, chills, changes in bowel movement, and abdominal pain. Tolerating a diet. Taking it easy with activity.     O:   Gen: NAD  CV: RR  Resp: NWOB  Abd: R femoral incision c/d, healing well  MSK: moves all  Neuro: GCS 15, AAOx3      Pathology:  Final Diagnosis   RIGHT INGUINAL HERNIA SAC:     HERNIA SAC.     CODE 3      Electronically signed by Richard Oliver MD on 10/4/2022 at 0825     A/P:  - Follow up as needed  - Shower as normal over the incision  - No heavy lifting for 2-4 weeks     Kelly Fraire PA-C  [facility]   10/04/2022 10:23 AM     The above findings, diagnostics, and treatment plan were discussed with the physician who will follow with further assessments and recommendations.

## 2022-11-07 ENCOUNTER — TELEPHONE (OUTPATIENT)
Dept: MEDSURG UNIT | Facility: HOSPITAL | Age: 72
End: 2022-11-07
Payer: MEDICARE

## 2022-11-07 NOTE — TELEPHONE ENCOUNTER
[facility] - Post discharge call     Pt called answering service and had some questions. Called him back but there was no answer. Left brief message.      Spoke to pt. He states that when he sits sometimes gets a little pain where the mesh would be. Not extreme pain or bothersome. He just wanted us to know. He is back to his usual activity of walking 10,000 steps a day. Still being cautious with lifting. Enc him to call if he needs or his pain increases to that area. He verbalized understanding

## (undated) DEVICE — ELECTRODE PATIENT RETURN DISP

## (undated) DEVICE — DRAPE FLUID WARMER ORS 44X44IN

## (undated) DEVICE — CLOSURE SKIN STERI STRIP 1/2X4

## (undated) DEVICE — KIT SURGICAL TURNOVER

## (undated) DEVICE — NDL HYPO REG 25G X 1 1/2

## (undated) DEVICE — SUT 2/0 30IN PROLENE MONO

## (undated) DEVICE — ADHESIVE MASTISOL VIAL 48/BX

## (undated) DEVICE — TRAY CATH FOL SIL URIMTR 16FR

## (undated) DEVICE — SUT VICRYL 2-0 27 CT-1

## (undated) DEVICE — Device

## (undated) DEVICE — SUT 3-0 12-18IN SILK

## (undated) DEVICE — DEVICE TRIVERSE HOLSTER 4.5MCO

## (undated) DEVICE — SUT MCRYL PLUS 3-0 PS2 27IN

## (undated) DEVICE — SPONGE LAP STRL 18X18IN

## (undated) DEVICE — GLOVE PROTEXIS LTX MICRO  7.5

## (undated) DEVICE — SUT VICRYL PLUS 3-0 SH 18IN

## (undated) DEVICE — SUT 2-0 12-18IN SILK

## (undated) DEVICE — GLOVE PROTEXIS BLUE LATEX 8

## (undated) DEVICE — DRAIN JP STD PENROSE 1/4X12IN

## (undated) DEVICE — NDL HYPO 22GX1 1/2 SYR 10ML LL